# Patient Record
Sex: FEMALE
[De-identification: names, ages, dates, MRNs, and addresses within clinical notes are randomized per-mention and may not be internally consistent; named-entity substitution may affect disease eponyms.]

---
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
## 2019-01-01 ENCOUNTER — HOSPITAL ENCOUNTER (EMERGENCY)
Dept: HOSPITAL 80 - FED | Age: 44
Discharge: HOME | End: 2019-01-01
Payer: MEDICAID

## 2019-01-01 VITALS — SYSTOLIC BLOOD PRESSURE: 122 MMHG | DIASTOLIC BLOOD PRESSURE: 80 MMHG
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
## 2019-01-01 DIAGNOSIS — S00.81XA: ICD-10-CM

## 2019-01-01 DIAGNOSIS — S01.511A: Primary | ICD-10-CM
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
## 2019-01-01 DIAGNOSIS — F10.920: ICD-10-CM

## 2019-01-01 DIAGNOSIS — F41.8: ICD-10-CM
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
## 2019-01-01 LAB — PLATELET # BLD: 413 10^3/UL (ref 150–400)

## 2019-01-01 PROCEDURE — 0CQ1XZZ REPAIR LOWER LIP, EXTERNAL APPROACH: ICD-10-PCS | Performed by: EMERGENCY MEDICINE

## 2019-01-01 PROCEDURE — G0480 DRUG TEST DEF 1-7 CLASSES: HCPCS
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
## 2019-03-25 ENCOUNTER — HOSPITAL ENCOUNTER (EMERGENCY)
Dept: HOSPITAL 80 - FED | Age: 44
Discharge: HOME | End: 2019-03-25
Payer: MEDICAID

## 2019-03-25 VITALS — DIASTOLIC BLOOD PRESSURE: 67 MMHG | SYSTOLIC BLOOD PRESSURE: 111 MMHG

## 2019-03-25 DIAGNOSIS — F10.920: Primary | ICD-10-CM
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
## 2019-03-29 ENCOUNTER — HOSPITAL ENCOUNTER (EMERGENCY)
Dept: HOSPITAL 80 - FED | Age: 44
Discharge: HOME | End: 2019-03-29
Payer: MEDICAID

## 2019-03-29 DIAGNOSIS — J30.81: ICD-10-CM

## 2019-03-29 DIAGNOSIS — F32.9: ICD-10-CM

## 2019-03-29 DIAGNOSIS — M79.89: Primary | ICD-10-CM

## 2019-03-29 DIAGNOSIS — F41.9: ICD-10-CM

## 2019-03-29 NOTE — EDPHY
H & P


Stated Complaint: allergy


Time Seen by Provider: 03/29/19 10:30


HPI/ROS: 





CHIEF COMPLAINT:  Allergic reaction





HISTORY OF PRESENT ILLNESS:  43-year-old female with anxiety and depression 

presents with an allergic reaction.  3 weeks ago, her son brought home a stray 

cat.  Onset of coughing after the cat entered the home.  No other URI symptoms 

or fever.  Concerned about allergic rxn, so she removed the cat from the home.  

c/o facial swelling and rash now.  Taking Benadryl every 3 hours.  Increasing 

anxiety and heart pounding x 3 days, associated with difficulty sleeping.  





REVIEW OF SYSTEMS:  complete 10 point ROS reviewed and is negative except for 

the noted elements in the HPI








- Personal History


LMP (Females 10-55): 1-7 Days Ago


Current Tetanus Diphtheria and Acellular Pertussis (TDAP): Unsure





- Medical/Surgical History


Hx Asthma: No


Hx Chronic Respiratory Disease: No


Hx Diabetes: No


Hx Cardiac Disease: No


Hx Renal Disease: No


Hx Cirrhosis: No


Hx Alcoholism: No


Hx HIV/AIDS: No


Hx Splenectomy or Spleen Trauma: No


Other PMH: anxiety, depression, etoh





- Social History


Smoking Status: Current some day smoker





- Physical Exam


Exam: 





General Appearance:  Alert, pleasant


Eyes:  Pupils equal and round, no conjunctival pallor or injection


ENT, Mouth:  Mucous membranes moist, no oral or facial swelling


Neck:  Normal inspection


Respiratory:  Lungs are clear to auscultation, no wheezing


Cardiovascular:  Regular rate and rhythm


Gastrointestinal:  Abdomen is soft and nontender


Neurological:  A&O, nonfocal, normal gait


Skin:  Warm and dry, no rash


Extremities:  Normal inspection


Psychiatric:  Anxious





Constitutional: 


 Initial Vital Signs











Temperature (C)  36.6 C   03/29/19 10:22


 


Heart Rate  94   03/29/19 10:22


 


Respiratory Rate  16   03/29/19 10:22


 


O2 Sat (%)  98   03/29/19 10:22











Allergies/Adverse Reactions: 


 





No Known Allergies Allergy (Verified 03/25/19 00:31)


 








Home Medications: 














 Medication  Instructions  Recorded


 


Citalopram  12/02/17














Medical Decision Making


ED Course/Re-evaluation: 





This patient presents with a likely allergic reaction to cats (vs URI).  

Physical exam is normal, without swelling, rash or bronchospasm.  No evidence 

allergic reaction or pneumonia.  Patient is taking excessive Benadryl, which 

may be exacerbating her underlying anxiety.  She is quite concerned that there 

is something seriously wrong with her.  I tried to reassure her multiple times, 

as did the ED RN.  I feel that she is safe and stable for discharge home.  She 

will follow up with primary care physician.


Differential Diagnosis: 





Differential diagnosis includes though it is not limited to pneumonia, urticaria

, laryngeal edema, bronchospasm, hypotension, angioedema.





Departure





- Departure


Disposition: Home, Routine, Self-Care


Clinical Impression: 


 Allergy to cats





Condition: Good


Instructions:  Allergies (ED)


Additional Instructions: 


1. Take Claritin in the morning and Benadryl (1 tablet) at night.


2. Follow-up with your physician on Monday.


3. Return for worsening symptoms or any concerns.





Referrals: 


Darling Delatorre MD [Primary Care Provider] - As per Instructions

## 2024-03-04 ENCOUNTER — APPOINTMENT (OUTPATIENT)
Dept: RADIOLOGY | Facility: MEDICAL CENTER | Age: 49
End: 2024-03-04
Attending: EMERGENCY MEDICINE

## 2024-03-04 ENCOUNTER — HOSPITAL ENCOUNTER (EMERGENCY)
Facility: MEDICAL CENTER | Age: 49
End: 2024-03-04
Attending: EMERGENCY MEDICINE

## 2024-03-04 VITALS
SYSTOLIC BLOOD PRESSURE: 108 MMHG | WEIGHT: 105.82 LBS | TEMPERATURE: 98 F | HEART RATE: 98 BPM | OXYGEN SATURATION: 98 % | RESPIRATION RATE: 17 BRPM | HEIGHT: 58 IN | BODY MASS INDEX: 22.21 KG/M2 | DIASTOLIC BLOOD PRESSURE: 70 MMHG

## 2024-03-04 DIAGNOSIS — F10.920 ALCOHOLIC INTOXICATION WITHOUT COMPLICATION (HCC): ICD-10-CM

## 2024-03-04 PROCEDURE — 99283 EMERGENCY DEPT VISIT LOW MDM: CPT

## 2024-03-04 PROCEDURE — 73140 X-RAY EXAM OF FINGER(S): CPT | Mod: LT

## 2024-03-05 NOTE — ED NOTES
Patient provided discharge instructions. Patient verbalized understanding. Patient leaving ER in stable condition. Patient ambulatory with steady gait. Bus pass and homeless shelter packet given.

## 2024-03-05 NOTE — ED NOTES
"Per ERP patient is for discharge, \"[facility]\" can come and  patient but patient does not know this person's number.  Patient came with patient in [place] and she knows [facility] number but strongly refused to give it to this RN. Patient is Aox4, ambulatory but does not know where she lives and know to pick her up. NIGEL Arroyo notified and will be able to provide bus pass and shelter packet.   "

## 2024-03-05 NOTE — DISCHARGE INSTRUCTIONS
You were seen in the ED today for alcohol intoxication. While here you were examined and allowed to sober up while we monitored you. Your physical exam was reassuring. If you continue to drink you will be placing your health at risk. At this time you are sober and can return home.    Please continue your treatment for your alcohol use disorder by following up with the [facility] clinic tomorrow as scheduled    Please be alert for signs of alcohol withdrawal, including shaking hands, agitation, feeling pins and needles, nausea, vomiting, headache. Please seek medical care if you develop these. Alcohol withdrawal can be dangerous and even lead to seizures or death if untreated.

## 2024-03-05 NOTE — ED TRIAGE NOTES
"Chief Complaint   Patient presents with    Alcohol Intoxication     Pt and her friend checked themselves out of rehab went on 3 day [place]. Pt intoxicated upon arrival. Ambulatory into triage       Pt BIB EMS for above. Pt states she is very \"drunk\" and is unsure of how much she had to drink. Pt aox4       /76   Pulse (!) 111   Temp 36.1 °C (97 °F) (Temporal)   Resp 18   Ht 1.473 m (4' 10\")   Wt 48 kg (105 lb 13.1 oz)   SpO2 98%   BMI 22.12 kg/m²     "

## 2024-03-05 NOTE — ED NOTES
"Microwave dinner, juice and water given. Phone being charged in order for patient to call \"[facility]\".   "

## 2024-03-05 NOTE — ED PROVIDER NOTES
"ED Provider Note        CHIEF COMPLAINT  Chief Complaint   Patient presents with    Alcohol Intoxication     Pt and her friend checked themselves out of rehab went on [place] day [place]. Pt intoxicated upon arrival. Ambulatory into triage         [facility]    Bren Laura is a 48 y.o. female who presents to the Emergency Department without intoxication.  The patient reports that she was 83 days sober at a treatment facility, however was kicked out of her treatment facility and began drinking with her friend and has been drinking for the past 3 days.  She reports that she has plans to go to personal: For treatment tomorrow.  She does report some occasional vomiting but no bloody vomiting, no fevers, no significant abdominal pain.    REVIEW OF SYSTEMS  See [facility] for further details. All other systems are negative.     PAST MEDICAL HISTORY   History reviewed. No pertinent past medical history.    SURGICAL HISTORY  History reviewed. No pertinent surgical history.    FAMILY HISTORY  History reviewed. No pertinent family history.    SOCIAL HISTORY    reports that she has an unknown smoking status. She has never used smokeless tobacco. She reports current alcohol use.    CURRENT MEDICATIONS  Home Medications    **Home medications have not yet been reviewed for this encounter**         ALLERGIES  No Known Allergies    PHYSICAL EXAM  VITAL SIGNS: /76   Pulse (!) 111   Temp 36.1 °C (97 °F) (Temporal)   Resp 18   Ht 1.473 m (4' 10\")   Wt 48 kg (105 lb 13.1 oz)   SpO2 98%   BMI 22.12 kg/m²   Gen: Alert, no acute distress  HEENT: ATNC  Eyes: PERRL, EOMI, normal conjunctiva  Neck: trachea midline  Resp: no respiratory distress  CV: No JVD, regular rate and rhythm  Abd: non-distended, soft, nontender  Ext: No deformities  Neuro: speech slightly slurred        COURSE & MEDICAL DECISION MAKING  Pertinent Labs & Imaging studies were reviewed. (See chart for details)          INITIAL ASSESSMENT AND PLAN  Care Narrative: Patient " presents with report of alcohol intoxication, appears to be consistent with the patient's presentation.  She reports that she has a friend that should be able to come pick her up and allow her to go to the detox facility in the morning.  She reports that this is already been scheduled for went to detox in the morning.  No signs of withdrawal.  At this point time, no signs of other toxidrome.  Patient appears stable for discharge.  Low suspicion for alcoholic hepatitis, pancreatitis    ADDITIONAL PROBLEM LIST AND DISPOSITION    Escalation of care considered, and ultimately not performed: blood analysis and diagnostic imaging.     Barriers to care at this time, including but not limited to: Patient does not have established PCP.     Decision tools and prescription drugs considered including, but not limited to: Medication modification no indication for benzodiazepines as no signs of withdrawal .      Patient is referred to primary care provider for blood pressure, diabetes and all other preventative health services.  Patient was given return precautions, anticipatory guidance, and the opportunity ask questions prior to discharge        FINAL IMPRESSION  1. Alcoholic intoxication without complication (HCC)           DISPOSITION:  Patient will be discharged home in stable condition.    FOLLOW UP:  [facility], Emergency Dept  [address] [address] [phone]    If symptoms worsen    [facility]  [address] [address] [phone]  Go in 1 day      [facility] [address]  [facility] [id number] [phone]    As needed         This dictation was created using voice recognition software. The accuracy of the dictation is limited to the abilities of the software. I expect there may be some errors of grammar and possibly content. The nursing notes were reviewed and certain aspects of this information were incorporated into  this note.

## 2024-03-05 NOTE — ED NOTES
Bedside report received from off going RN: Paula, assumed care of patient.     Fall risk interventions in place: Not Applicable (all applicable per [place] Fall risk assessment)   Continuous monitoring: Not Applicable   IVF/IV medications: Not Applicable   Oxygen: Room Air  Bedside sitter: Not Applicable   Isolation: Not Applicable

## 2024-04-08 ENCOUNTER — HOSPITAL ENCOUNTER (OUTPATIENT)
Dept: LAB | Facility: MEDICAL CENTER | Age: 49
End: 2024-04-08
Attending: NURSE PRACTITIONER
Payer: MEDICAID

## 2024-04-08 LAB
ALBUMIN SERPL BCP-MCNC: 4.4 G/DL (ref 3.2–4.9)
ALBUMIN/GLOB SERPL: 1.8 G/DL
ALP SERPL-CCNC: 101 U/L (ref 30–99)
ALT SERPL-CCNC: 98 U/L (ref 2–50)
ANION GAP SERPL CALC-SCNC: 13 MMOL/L (ref 7–16)
AST SERPL-CCNC: 39 U/L (ref 12–45)
BASOPHILS # BLD AUTO: 1.6 % (ref 0–1.8)
BASOPHILS # BLD: 0.1 K/UL (ref 0–0.12)
BILIRUB SERPL-MCNC: 0.3 MG/DL (ref 0.1–1.5)
BUN SERPL-MCNC: 8 MG/DL (ref 8–22)
CALCIUM ALBUM COR SERPL-MCNC: 9.1 MG/DL (ref 8.5–10.5)
CALCIUM SERPL-MCNC: 9.4 MG/DL (ref 8.5–10.5)
CHLORIDE SERPL-SCNC: 99 MMOL/L (ref 96–112)
CHOLEST SERPL-MCNC: 232 MG/DL (ref 100–199)
CO2 SERPL-SCNC: 24 MMOL/L (ref 20–33)
CREAT SERPL-MCNC: 0.66 MG/DL (ref 0.5–1.4)
EOSINOPHIL # BLD AUTO: 0.04 K/UL (ref 0–0.51)
EOSINOPHIL NFR BLD: 0.6 % (ref 0–6.9)
ERYTHROCYTE [DISTWIDTH] IN BLOOD BY AUTOMATED COUNT: 42.5 FL (ref 35.9–50)
FASTING STATUS PATIENT QL REPORTED: NORMAL
GFR SERPLBLD CREATININE-BSD FMLA CKD-EPI: 109 ML/MIN/1.73 M 2
GLOBULIN SER CALC-MCNC: 2.4 G/DL (ref 1.9–3.5)
GLUCOSE SERPL-MCNC: 78 MG/DL (ref 65–99)
HAV IGM SERPL QL IA: NORMAL
HBV CORE IGM SER QL: NORMAL
HBV SURFACE AG SER QL: NORMAL
HCT VFR BLD AUTO: 42.8 % (ref 37–47)
HCV AB SER QL: NORMAL
HDLC SERPL-MCNC: 64 MG/DL
HGB BLD-MCNC: 14.5 G/DL (ref 12–16)
IMM GRANULOCYTES # BLD AUTO: 0.02 K/UL (ref 0–0.11)
IMM GRANULOCYTES NFR BLD AUTO: 0.3 % (ref 0–0.9)
LDLC SERPL CALC-MCNC: 150 MG/DL
LYMPHOCYTES # BLD AUTO: 1.47 K/UL (ref 1–4.8)
LYMPHOCYTES NFR BLD: 23.5 % (ref 22–41)
MCH RBC QN AUTO: 31.3 PG (ref 27–33)
MCHC RBC AUTO-ENTMCNC: 33.9 G/DL (ref 32.2–35.5)
MCV RBC AUTO: 92.4 FL (ref 81.4–97.8)
MONOCYTES # BLD AUTO: 0.54 K/UL (ref 0–0.85)
MONOCYTES NFR BLD AUTO: 8.6 % (ref 0–13.4)
NEUTROPHILS # BLD AUTO: 4.08 K/UL (ref 1.82–7.42)
NEUTROPHILS NFR BLD: 65.4 % (ref 44–72)
NRBC # BLD AUTO: 0 K/UL
NRBC BLD-RTO: 0 /100 WBC (ref 0–0.2)
PLATELET # BLD AUTO: 441 K/UL (ref 164–446)
PMV BLD AUTO: 9.5 FL (ref 9–12.9)
POTASSIUM SERPL-SCNC: 4.7 MMOL/L (ref 3.6–5.5)
PROT SERPL-MCNC: 6.8 G/DL (ref 6–8.2)
RBC # BLD AUTO: 4.63 M/UL (ref 4.2–5.4)
SODIUM SERPL-SCNC: 136 MMOL/L (ref 135–145)
T PALLIDUM AB SER QL IA: NORMAL
TRIGL SERPL-MCNC: 91 MG/DL (ref 0–149)
TSH SERPL DL<=0.005 MIU/L-ACNC: 1.32 UIU/ML (ref 0.38–5.33)
WBC # BLD AUTO: 6.3 K/UL (ref 4.8–10.8)

## 2024-04-08 PROCEDURE — 87591 N.GONORRHOEAE DNA AMP PROB: CPT

## 2024-04-08 PROCEDURE — 80061 LIPID PANEL: CPT

## 2024-04-08 PROCEDURE — 80053 COMPREHEN METABOLIC PANEL: CPT

## 2024-04-08 PROCEDURE — 86780 TREPONEMA PALLIDUM: CPT

## 2024-04-08 PROCEDURE — 80074 ACUTE HEPATITIS PANEL: CPT

## 2024-04-08 PROCEDURE — 85025 COMPLETE CBC W/AUTO DIFF WBC: CPT

## 2024-04-08 PROCEDURE — 87491 CHLMYD TRACH DNA AMP PROBE: CPT

## 2024-04-08 PROCEDURE — 84443 ASSAY THYROID STIM HORMONE: CPT

## 2024-04-08 PROCEDURE — 36415 COLL VENOUS BLD VENIPUNCTURE: CPT

## 2024-04-10 LAB
C TRACH DNA SPEC QL NAA+PROBE: NEGATIVE
N GONORRHOEA DNA SPEC QL NAA+PROBE: NEGATIVE
SPECIMEN SOURCE: NORMAL